# Patient Record
Sex: FEMALE | Race: WHITE | NOT HISPANIC OR LATINO | Employment: FULL TIME | URBAN - METROPOLITAN AREA
[De-identification: names, ages, dates, MRNs, and addresses within clinical notes are randomized per-mention and may not be internally consistent; named-entity substitution may affect disease eponyms.]

---

## 2019-12-18 LAB — PAP SMEAR: NORMAL

## 2020-05-28 DIAGNOSIS — Z12.31 ENCOUNTER FOR SCREENING MAMMOGRAM FOR MALIGNANT NEOPLASM OF BREAST: Primary | ICD-10-CM

## 2020-06-03 PROBLEM — E03.9 HYPOTHYROIDISM: Status: ACTIVE | Noted: 2020-06-03

## 2020-06-03 PROBLEM — Z72.0 TOBACCO USE: Status: ACTIVE | Noted: 2020-06-03

## 2020-06-05 ENCOUNTER — HOSPITAL ENCOUNTER (OUTPATIENT)
Dept: RADIOLOGY | Facility: HOSPITAL | Age: 45
Discharge: HOME | End: 2020-06-05
Attending: FAMILY MEDICINE
Payer: COMMERCIAL

## 2020-06-05 VITALS — HEIGHT: 62 IN | BODY MASS INDEX: 27.79 KG/M2 | WEIGHT: 151 LBS

## 2020-06-05 DIAGNOSIS — Z12.31 ENCOUNTER FOR SCREENING MAMMOGRAM FOR MALIGNANT NEOPLASM OF BREAST: ICD-10-CM

## 2020-06-05 PROCEDURE — 77067 SCR MAMMO BI INCL CAD: CPT | Mod: TC,PO

## 2021-01-15 ENCOUNTER — TELEPHONE (OUTPATIENT)
Dept: FAMILY MEDICINE | Facility: CLINIC | Age: 46
End: 2021-01-15
Payer: COMMERCIAL

## 2021-01-16 NOTE — TELEPHONE ENCOUNTER
----- Message from Marie Arevalo sent at 1/15/2021 12:32 PM CST -----  Regarding: Rx refills  Type:  RX Refill Request    Who Called:  ALVINA BREAUX [[id number]]  Refill or New Rx:  Refill  RX Name and Strength:  ALPRAZolam (XANAX) 0.5 MG tablet How is the patient currently taking it? (ex. 1XDay):  As needed,lately has been taking 1xday    RX Name and Strength: levothyroxine (SYNTHROID) 137 MCG Tab tablet How is the patient currently taking it? (ex. 1XDay):  1xday  Is this a 30 day or 90 day RX:  30 days for both  Preferred Pharmacy with phone number:  [facility] at [phone]  Local or Mail Order:  Local  Ordering Provider:  Dr. Jahaira Lopez Call Back Number:  [phone]  Additional Information:  Patient states she is a  and not able to come in to do blood work. Pt also states Dr. Campo advised her to still call and request her refills even if she cannot come to do the blood work before.

## 2021-01-19 ENCOUNTER — TELEPHONE (OUTPATIENT)
Dept: FAMILY MEDICINE | Facility: CLINIC | Age: 46
End: 2021-01-19

## 2021-06-03 ENCOUNTER — TELEPHONE (OUTPATIENT)
Dept: FAMILY MEDICINE | Facility: CLINIC | Age: 46
End: 2021-06-03

## 2021-06-03 DIAGNOSIS — E03.9 HYPOTHYROIDISM, UNSPECIFIED TYPE: ICD-10-CM

## 2021-06-03 DIAGNOSIS — Z00.00 PHYSICAL EXAM: Primary | ICD-10-CM

## 2021-06-03 DIAGNOSIS — Z11.4 ENCOUNTER FOR SCREENING FOR HUMAN IMMUNODEFICIENCY VIRUS (HIV): ICD-10-CM

## 2021-06-03 DIAGNOSIS — Z11.59 ENCOUNTER FOR HEPATITIS C SCREENING TEST FOR LOW RISK PATIENT: ICD-10-CM

## 2021-06-07 ENCOUNTER — OFFICE VISIT (OUTPATIENT)
Dept: FAMILY MEDICINE | Facility: CLINIC | Age: 46
End: 2021-06-07
Payer: COMMERCIAL

## 2021-06-07 VITALS
DIASTOLIC BLOOD PRESSURE: 59 MMHG | SYSTOLIC BLOOD PRESSURE: 109 MMHG | OXYGEN SATURATION: 98 % | BODY MASS INDEX: 30.18 KG/M2 | WEIGHT: 164 LBS | TEMPERATURE: 97 F | HEART RATE: 86 BPM | HEIGHT: 62 IN

## 2021-06-07 DIAGNOSIS — Z12.39 SCREENING BREAST EXAMINATION: ICD-10-CM

## 2021-06-07 DIAGNOSIS — F41.9 ANXIETY: ICD-10-CM

## 2021-06-07 DIAGNOSIS — Z11.51 SCREENING FOR HPV (HUMAN PAPILLOMAVIRUS): ICD-10-CM

## 2021-06-07 DIAGNOSIS — Z72.0 TOBACCO USE: ICD-10-CM

## 2021-06-07 DIAGNOSIS — E03.9 HYPOTHYROIDISM, UNSPECIFIED TYPE: Primary | ICD-10-CM

## 2021-06-07 DIAGNOSIS — Z78.0 MENOPAUSE: ICD-10-CM

## 2021-06-07 DIAGNOSIS — Z12.31 ENCOUNTER FOR SCREENING MAMMOGRAM FOR BREAST CANCER: Primary | ICD-10-CM

## 2021-06-07 PROCEDURE — 99214 OFFICE O/P EST MOD 30 MIN: CPT | Mod: 25,S$GLB,, | Performed by: FAMILY MEDICINE

## 2021-06-07 PROCEDURE — 90471 TD VACCINE GREATER THAN OR EQUAL TO 7YO PRESERVATIVE FREE IM: ICD-10-PCS | Mod: S$GLB,,, | Performed by: FAMILY MEDICINE

## 2021-06-07 PROCEDURE — 99214 PR OFFICE/OUTPT VISIT, EST, LEVL IV, 30-39 MIN: ICD-10-PCS | Mod: 25,S$GLB,, | Performed by: FAMILY MEDICINE

## 2021-06-07 PROCEDURE — 90714 TD VACCINE GREATER THAN OR EQUAL TO 7YO PRESERVATIVE FREE IM: ICD-10-PCS | Mod: S$GLB,,, | Performed by: FAMILY MEDICINE

## 2021-06-07 PROCEDURE — 1126F AMNT PAIN NOTED NONE PRSNT: CPT | Mod: S$GLB,,, | Performed by: FAMILY MEDICINE

## 2021-06-07 PROCEDURE — 1126F PR PAIN SEVERITY QUANTIFIED, NO PAIN PRESENT: ICD-10-PCS | Mod: S$GLB,,, | Performed by: FAMILY MEDICINE

## 2021-06-07 PROCEDURE — 3008F BODY MASS INDEX DOCD: CPT | Mod: CPTII,S$GLB,, | Performed by: FAMILY MEDICINE

## 2021-06-07 PROCEDURE — 90714 TD VACC NO PRESV 7 YRS+ IM: CPT | Mod: S$GLB,,, | Performed by: FAMILY MEDICINE

## 2021-06-07 PROCEDURE — 3008F PR BODY MASS INDEX (BMI) DOCUMENTED: ICD-10-PCS | Mod: CPTII,S$GLB,, | Performed by: FAMILY MEDICINE

## 2021-06-07 PROCEDURE — 90471 IMMUNIZATION ADMIN: CPT | Mod: S$GLB,,, | Performed by: FAMILY MEDICINE

## 2021-06-07 RX ORDER — BUPROPION HYDROCHLORIDE 150 MG/1
150 TABLET, EXTENDED RELEASE ORAL 2 TIMES DAILY
COMMUNITY
End: 2021-06-07 | Stop reason: SDUPTHER

## 2021-06-07 RX ORDER — BUPROPION HYDROCHLORIDE 150 MG/1
TABLET, EXTENDED RELEASE ORAL
Qty: 60 TABLET | Refills: 2 | Status: SHIPPED | OUTPATIENT
Start: 2021-06-07 | End: 2022-02-01

## 2021-06-07 RX ORDER — LEVOTHYROXINE SODIUM 137 UG/1
137 TABLET ORAL DAILY
Qty: 90 TABLET | Refills: 1 | Status: SHIPPED | OUTPATIENT
Start: 2021-06-07 | End: 2022-01-14

## 2021-06-07 RX ORDER — ALPRAZOLAM 0.5 MG/1
0.5 TABLET ORAL NIGHTLY PRN
Qty: 30 TABLET | Refills: 2 | Status: SHIPPED | OUTPATIENT
Start: 2021-06-07 | End: 2022-11-01 | Stop reason: SDUPTHER

## 2021-06-29 ENCOUNTER — HOSPITAL ENCOUNTER (OUTPATIENT)
Dept: RADIOLOGY | Facility: HOSPITAL | Age: 46
Discharge: HOME | End: 2021-06-29
Attending: FAMILY MEDICINE
Payer: COMMERCIAL

## 2021-06-29 VITALS — WEIGHT: 164 LBS | HEIGHT: 62 IN | BODY MASS INDEX: 30.18 KG/M2

## 2021-06-29 DIAGNOSIS — Z12.31 ENCOUNTER FOR SCREENING MAMMOGRAM FOR BREAST CANCER: ICD-10-CM

## 2021-06-29 PROCEDURE — 77067 SCR MAMMO BI INCL CAD: CPT | Mod: TC,PO

## 2021-06-30 LAB
ALBUMIN SERPL-MCNC: 4.4 G/DL (ref 3.6–5.1)
ALBUMIN/GLOB SERPL: 1.5 (CALC) (ref 1–2.5)
ALP SERPL-CCNC: 67 U/L (ref 31–125)
ALT SERPL-CCNC: 10 U/L (ref 6–29)
AST SERPL-CCNC: 17 U/L (ref 10–35)
BASOPHILS # BLD AUTO: 40 CELLS/UL (ref 0–200)
BASOPHILS NFR BLD AUTO: 0.6 %
BILIRUB SERPL-MCNC: 0.3 MG/DL (ref 0.2–1.2)
BUN SERPL-MCNC: 13 MG/DL (ref 7–25)
BUN/CREAT SERPL: NORMAL (CALC) (ref 6–22)
CALCIUM SERPL-MCNC: 9.8 MG/DL (ref 8.6–10.2)
CHLORIDE SERPL-SCNC: 103 MMOL/L (ref 98–110)
CHOLEST SERPL-MCNC: 284 MG/DL
CHOLEST/HDLC SERPL: 4.3 (CALC)
CO2 SERPL-SCNC: 28 MMOL/L (ref 20–32)
CREAT SERPL-MCNC: 0.68 MG/DL (ref 0.5–1.1)
EOSINOPHIL # BLD AUTO: 92 CELLS/UL (ref 15–500)
EOSINOPHIL NFR BLD AUTO: 1.4 %
ERYTHROCYTE [DISTWIDTH] IN BLOOD BY AUTOMATED COUNT: 12.5 % (ref 11–15)
GLOBULIN SER CALC-MCNC: 2.9 G/DL (CALC) (ref 1.9–3.7)
GLUCOSE SERPL-MCNC: 78 MG/DL (ref 65–99)
HCT VFR BLD AUTO: 44 % (ref 35–45)
HCV AB S/CO SERPL IA: 0.04
HCV AB SERPL QL IA: NORMAL
HDLC SERPL-MCNC: 66 MG/DL
HGB BLD-MCNC: 14.8 G/DL (ref 11.7–15.5)
HIV 1+2 AB+HIV1 P24 AG SERPL QL IA: NORMAL
LDLC SERPL CALC-MCNC: 182 MG/DL (CALC)
LYMPHOCYTES # BLD AUTO: 1426 CELLS/UL (ref 850–3900)
LYMPHOCYTES NFR BLD AUTO: 21.6 %
MCH RBC QN AUTO: 32.2 PG (ref 27–33)
MCHC RBC AUTO-ENTMCNC: 33.6 G/DL (ref 32–36)
MCV RBC AUTO: 95.7 FL (ref 80–100)
MONOCYTES # BLD AUTO: 620 CELLS/UL (ref 200–950)
MONOCYTES NFR BLD AUTO: 9.4 %
NEUTROPHILS # BLD AUTO: 4422 CELLS/UL (ref 1500–7800)
NEUTROPHILS NFR BLD AUTO: 67 %
NONHDLC SERPL-MCNC: 218 MG/DL (CALC)
PLATELET # BLD AUTO: 310 THOUSAND/UL (ref 140–400)
PMV BLD REES-ECKER: 10.1 FL (ref 7.5–12.5)
POTASSIUM SERPL-SCNC: 4.5 MMOL/L (ref 3.5–5.3)
PROT SERPL-MCNC: 7.3 G/DL (ref 6.1–8.1)
RBC # BLD AUTO: 4.6 MILLION/UL (ref 3.8–5.1)
SODIUM SERPL-SCNC: 138 MMOL/L (ref 135–146)
T4 FREE SERPL-MCNC: 1.2 NG/DL (ref 0.8–1.8)
TRIGL SERPL-MCNC: 204 MG/DL
TSH SERPL-ACNC: 2.43 MIU/L
WBC # BLD AUTO: 6.6 THOUSAND/UL (ref 3.8–10.8)

## 2021-07-12 ENCOUNTER — OFFICE VISIT (OUTPATIENT)
Dept: FAMILY MEDICINE | Facility: CLINIC | Age: 46
End: 2021-07-12
Payer: COMMERCIAL

## 2021-07-12 VITALS
DIASTOLIC BLOOD PRESSURE: 59 MMHG | TEMPERATURE: 97 F | HEIGHT: 62 IN | WEIGHT: 161 LBS | BODY MASS INDEX: 29.63 KG/M2 | SYSTOLIC BLOOD PRESSURE: 110 MMHG | HEART RATE: 88 BPM | OXYGEN SATURATION: 98 %

## 2021-07-12 DIAGNOSIS — E03.9 HYPOTHYROIDISM, UNSPECIFIED TYPE: Primary | ICD-10-CM

## 2021-07-12 DIAGNOSIS — Z72.0 TOBACCO USE: ICD-10-CM

## 2021-07-12 DIAGNOSIS — E78.5 HYPERLIPIDEMIA, UNSPECIFIED HYPERLIPIDEMIA TYPE: ICD-10-CM

## 2021-07-12 DIAGNOSIS — J30.9 ALLERGIC RHINITIS, UNSPECIFIED SEASONALITY, UNSPECIFIED TRIGGER: ICD-10-CM

## 2021-07-12 PROCEDURE — 3008F BODY MASS INDEX DOCD: CPT | Mod: CPTII,S$GLB,, | Performed by: FAMILY MEDICINE

## 2021-07-12 PROCEDURE — 3008F PR BODY MASS INDEX (BMI) DOCUMENTED: ICD-10-PCS | Mod: CPTII,S$GLB,, | Performed by: FAMILY MEDICINE

## 2021-07-12 PROCEDURE — 1126F PR PAIN SEVERITY QUANTIFIED, NO PAIN PRESENT: ICD-10-PCS | Mod: S$GLB,,, | Performed by: FAMILY MEDICINE

## 2021-07-12 PROCEDURE — 99214 OFFICE O/P EST MOD 30 MIN: CPT | Mod: S$GLB,,, | Performed by: FAMILY MEDICINE

## 2021-07-12 PROCEDURE — 99214 PR OFFICE/OUTPT VISIT, EST, LEVL IV, 30-39 MIN: ICD-10-PCS | Mod: S$GLB,,, | Performed by: FAMILY MEDICINE

## 2021-07-12 PROCEDURE — 1126F AMNT PAIN NOTED NONE PRSNT: CPT | Mod: S$GLB,,, | Performed by: FAMILY MEDICINE

## 2021-07-12 RX ORDER — ROSUVASTATIN CALCIUM 20 MG/1
20 TABLET, COATED ORAL DAILY
COMMUNITY
End: 2021-07-12 | Stop reason: SDUPTHER

## 2021-07-12 RX ORDER — ROSUVASTATIN CALCIUM 20 MG/1
20 TABLET, COATED ORAL DAILY
Qty: 90 TABLET | Refills: 1 | Status: SHIPPED | OUTPATIENT
Start: 2021-07-12 | End: 2022-02-01 | Stop reason: SDUPTHER

## 2021-07-18 PROBLEM — E78.5 HYPERLIPIDEMIA: Status: ACTIVE | Noted: 2021-07-18

## 2021-07-18 PROBLEM — J30.9 ALLERGIC RHINITIS: Status: ACTIVE | Noted: 2021-07-18

## 2021-09-09 ENCOUNTER — PATIENT OUTREACH (OUTPATIENT)
Dept: ADMINISTRATIVE | Facility: HOSPITAL | Age: 46
End: 2021-09-09

## 2021-10-12 ENCOUNTER — TELEPHONE (OUTPATIENT)
Dept: FAMILY MEDICINE | Facility: CLINIC | Age: 46
End: 2021-10-12

## 2022-01-12 ENCOUNTER — TELEPHONE (OUTPATIENT)
Dept: CARDIOLOGY | Facility: CLINIC | Age: 47
End: 2022-01-12
Payer: COMMERCIAL

## 2022-01-12 NOTE — TELEPHONE ENCOUNTER
----- Message from Pedro Carpenter sent at 1/12/2022  3:08 PM CST -----  Contact: pt  Type: Rx Refill Request    Who Called:pt  Refill or New Rx:Refill  Rx Name and Strength:    levothyroxine (SYNTHROID) 137 MCG Tab tablet    How is the patient currently taking it?(ex.1xday): As Directed  Is this a 30 day or 90 day Rx: As Directed  Preferred Pharmacy with Phone Number:    [facility] - [place], [place] - [address] [facility] [address] [address] [address] [address]  [place] [address]  Phone: [phone] Fax: [phone]      Trying to be seen when pt available for bloodwork and appt     Call back for this [phone]

## 2022-02-01 ENCOUNTER — OFFICE VISIT (OUTPATIENT)
Dept: FAMILY MEDICINE | Facility: CLINIC | Age: 47
End: 2022-02-01
Payer: COMMERCIAL

## 2022-02-01 ENCOUNTER — HOSPITAL ENCOUNTER (OUTPATIENT)
Dept: RADIOLOGY | Facility: HOSPITAL | Age: 47
Discharge: HOME | End: 2022-02-01
Attending: FAMILY MEDICINE
Payer: COMMERCIAL

## 2022-02-01 VITALS
BODY MASS INDEX: 30.18 KG/M2 | DIASTOLIC BLOOD PRESSURE: 60 MMHG | HEART RATE: 84 BPM | WEIGHT: 164 LBS | SYSTOLIC BLOOD PRESSURE: 120 MMHG | OXYGEN SATURATION: 98 % | HEIGHT: 62 IN | TEMPERATURE: 98 F

## 2022-02-01 DIAGNOSIS — M25.551 ARTHRALGIA OF RIGHT HIP: ICD-10-CM

## 2022-02-01 DIAGNOSIS — Z72.0 TOBACCO USE: ICD-10-CM

## 2022-02-01 DIAGNOSIS — E78.5 HYPERLIPIDEMIA, UNSPECIFIED HYPERLIPIDEMIA TYPE: Primary | ICD-10-CM

## 2022-02-01 DIAGNOSIS — Z12.11 COLON CANCER SCREENING: ICD-10-CM

## 2022-02-01 DIAGNOSIS — E03.9 HYPOTHYROIDISM, UNSPECIFIED TYPE: ICD-10-CM

## 2022-02-01 PROCEDURE — 3078F PR MOST RECENT DIASTOLIC BLOOD PRESSURE < 80 MM HG: ICD-10-PCS | Mod: CPTII,S$GLB,, | Performed by: FAMILY MEDICINE

## 2022-02-01 PROCEDURE — 3074F SYST BP LT 130 MM HG: CPT | Mod: CPTII,S$GLB,, | Performed by: FAMILY MEDICINE

## 2022-02-01 PROCEDURE — 99214 OFFICE O/P EST MOD 30 MIN: CPT | Mod: S$GLB,,, | Performed by: FAMILY MEDICINE

## 2022-02-01 PROCEDURE — 3008F BODY MASS INDEX DOCD: CPT | Mod: CPTII,S$GLB,, | Performed by: FAMILY MEDICINE

## 2022-02-01 PROCEDURE — 1159F MED LIST DOCD IN RCRD: CPT | Mod: CPTII,S$GLB,, | Performed by: FAMILY MEDICINE

## 2022-02-01 PROCEDURE — 99214 PR OFFICE/OUTPT VISIT, EST, LEVL IV, 30-39 MIN: ICD-10-PCS | Mod: S$GLB,,, | Performed by: FAMILY MEDICINE

## 2022-02-01 PROCEDURE — 3074F PR MOST RECENT SYSTOLIC BLOOD PRESSURE < 130 MM HG: ICD-10-PCS | Mod: CPTII,S$GLB,, | Performed by: FAMILY MEDICINE

## 2022-02-01 PROCEDURE — 73502 X-RAY EXAM HIP UNI 2-3 VIEWS: CPT | Mod: TC,RT

## 2022-02-01 PROCEDURE — 3078F DIAST BP <80 MM HG: CPT | Mod: CPTII,S$GLB,, | Performed by: FAMILY MEDICINE

## 2022-02-01 PROCEDURE — 3008F PR BODY MASS INDEX (BMI) DOCUMENTED: ICD-10-PCS | Mod: CPTII,S$GLB,, | Performed by: FAMILY MEDICINE

## 2022-02-01 PROCEDURE — 1159F PR MEDICATION LIST DOCUMENTED IN MEDICAL RECORD: ICD-10-PCS | Mod: CPTII,S$GLB,, | Performed by: FAMILY MEDICINE

## 2022-02-01 RX ORDER — BUPROPION HYDROCHLORIDE 300 MG/1
300 TABLET ORAL DAILY
COMMUNITY
End: 2022-02-01 | Stop reason: SDUPTHER

## 2022-02-01 RX ORDER — BUPROPION HYDROCHLORIDE 150 MG/1
TABLET, EXTENDED RELEASE ORAL
Qty: 60 TABLET | Refills: 2 | Status: CANCELLED | OUTPATIENT
Start: 2022-02-01

## 2022-02-01 RX ORDER — HYDROCODONE BITARTRATE AND ACETAMINOPHEN 7.5; 325 MG/1; MG/1
1 TABLET ORAL
COMMUNITY
Start: 2021-10-21 | End: 2023-10-20

## 2022-02-01 RX ORDER — LEVOTHYROXINE SODIUM 137 UG/1
137 TABLET ORAL DAILY
Qty: 90 TABLET | Refills: 1 | Status: SHIPPED | OUTPATIENT
Start: 2022-02-01 | End: 2022-11-01 | Stop reason: SDUPTHER

## 2022-02-01 RX ORDER — BUPROPION HYDROCHLORIDE 300 MG/1
300 TABLET ORAL DAILY
Qty: 90 TABLET | Refills: 1 | Status: SHIPPED | OUTPATIENT
Start: 2022-02-01 | End: 2022-11-01 | Stop reason: SDUPTHER

## 2022-02-01 RX ORDER — ROSUVASTATIN CALCIUM 20 MG/1
20 TABLET, COATED ORAL DAILY
Qty: 90 TABLET | Refills: 1 | Status: SHIPPED | OUTPATIENT
Start: 2022-02-01 | End: 2022-11-01 | Stop reason: SDUPTHER

## 2022-02-02 LAB
ALBUMIN SERPL-MCNC: 4.5 G/DL (ref 3.6–5.1)
ALBUMIN/GLOB SERPL: 1.5 (CALC) (ref 1–2.5)
ALP SERPL-CCNC: 75 U/L (ref 31–125)
ALT SERPL-CCNC: 10 U/L (ref 6–29)
AST SERPL-CCNC: 18 U/L (ref 10–35)
BILIRUB SERPL-MCNC: 0.7 MG/DL (ref 0.2–1.2)
BUN SERPL-MCNC: 11 MG/DL (ref 7–25)
BUN/CREAT SERPL: NORMAL (CALC) (ref 6–22)
CALCIUM SERPL-MCNC: 10.2 MG/DL (ref 8.6–10.2)
CHLORIDE SERPL-SCNC: 102 MMOL/L (ref 98–110)
CHOLEST SERPL-MCNC: 287 MG/DL
CHOLEST/HDLC SERPL: 3.7 (CALC)
CO2 SERPL-SCNC: 25 MMOL/L (ref 20–32)
CREAT SERPL-MCNC: 0.71 MG/DL (ref 0.5–1.1)
GLOBULIN SER CALC-MCNC: 3 G/DL (CALC) (ref 1.9–3.7)
GLUCOSE SERPL-MCNC: 81 MG/DL (ref 65–99)
HDLC SERPL-MCNC: 78 MG/DL
LDLC SERPL CALC-MCNC: 178 MG/DL (CALC)
NONHDLC SERPL-MCNC: 209 MG/DL (CALC)
POTASSIUM SERPL-SCNC: 5 MMOL/L (ref 3.5–5.3)
PROT SERPL-MCNC: 7.5 G/DL (ref 6.1–8.1)
SODIUM SERPL-SCNC: 140 MMOL/L (ref 135–146)
T4 FREE SERPL-MCNC: 1.3 NG/DL (ref 0.8–1.8)
TRIGL SERPL-MCNC: 164 MG/DL
TSH SERPL-ACNC: 3.11 MIU/L

## 2022-02-02 NOTE — PROGRESS NOTES
Early December started having pain right proximal thigh did some climbing in and out of the attic then pain right medial groin often on with some movements.  Increased by getting into the truck.  Not really the action of lifting the leg but pushing up.  Was hurting when walking but has gotten little better.  Was limping some also.  It is slightly better.  Has not used any medication for it.  Also follow-up hypothyroidism tobacco use hyperlipidemia.  Colon cancer screening is due.  No family history.    Physical examination vital signs are noted.  Neck without bruit.  Chest clear.  Heart regular rate rhythm.  Abdomen bowel sounds are positive soft nontender no CVA tenderness.  Right groin no bulge no evidence of hernia nontender.  Right hip painful with flexed internal rotation.    Impression.  Hypothyroidism.  Tobacco use.  Hyperlipidemia.  Arthralgia of the right hip.  High colon cancer screening.  Discussed colonoscopy and Cologuard she wants to do  the Cologuard.    Plan change Wellbutrin  daily.  Ninety with 1 refill.  See if this will help with the smoking.  Refill Crestor 20 mg daily 90 with a refill.  Refill levothyroxine 13312 with a refill.  Free T4 TSH lipid CMP.  Cologuard ordered.  X-ray the right hip.

## 2022-02-04 ENCOUNTER — TELEPHONE (OUTPATIENT)
Dept: FAMILY MEDICINE | Facility: CLINIC | Age: 47
End: 2022-02-04
Payer: COMMERCIAL

## 2022-02-04 PROBLEM — J30.9 ALLERGIC RHINITIS: Status: RESOLVED | Noted: 2021-07-18 | Resolved: 2022-02-04

## 2022-02-04 PROBLEM — M25.551 ARTHRALGIA OF RIGHT HIP: Status: ACTIVE | Noted: 2022-02-04

## 2022-02-04 NOTE — TELEPHONE ENCOUNTER
----- Message from Pedro Carpenter sent at 2/4/2022  4:17 PM CST -----  Contact: pt  Type: Needs Medical Advice    Who Called:pt  Best Call Back Number:[phone]    Additional Information: Requesting callback regarding pt has xray results and needs a call back to review them please call back pt     Please Advise-Thank you       Done

## 2022-04-05 DIAGNOSIS — Z12.11 SPECIAL SCREENING FOR MALIGNANT NEOPLASMS, COLON: Primary | ICD-10-CM

## 2022-07-19 DIAGNOSIS — Z12.31 ENCOUNTER FOR SCREENING MAMMOGRAM FOR MALIGNANT NEOPLASM OF BREAST: Primary | ICD-10-CM

## 2022-07-29 ENCOUNTER — HOSPITAL ENCOUNTER (OUTPATIENT)
Dept: RADIOLOGY | Facility: HOSPITAL | Age: 47
Discharge: HOME | End: 2022-07-29
Attending: FAMILY MEDICINE
Payer: COMMERCIAL

## 2022-07-29 VITALS — BODY MASS INDEX: 30.18 KG/M2 | HEIGHT: 62 IN | WEIGHT: 164 LBS

## 2022-07-29 DIAGNOSIS — Z12.31 ENCOUNTER FOR SCREENING MAMMOGRAM FOR MALIGNANT NEOPLASM OF BREAST: ICD-10-CM

## 2022-07-29 PROCEDURE — 77063 BREAST TOMOSYNTHESIS BI: CPT | Mod: TC,PO

## 2022-07-29 PROCEDURE — 77067 SCR MAMMO BI INCL CAD: CPT | Mod: TC,PO

## 2022-10-21 ENCOUNTER — TELEPHONE (OUTPATIENT)
Dept: FAMILY MEDICINE | Facility: CLINIC | Age: 47
End: 2022-10-21
Payer: COMMERCIAL

## 2022-10-21 NOTE — TELEPHONE ENCOUNTER
----- Message from Trung Agudelo sent at 10/21/2022  1:15 PM CDT -----  Regarding: refill  Contact: Patient  Type:  RX Refill Request    Who Called:  Patient  Refill or New Rx:  Refill  RX Name and Strength:  WELLBUTRIN XL) 300   How is the patient currently taking it? (ex. 1XDay):  one time a day  Is this a 30 day or 90 day RX:  30day  Preferred Pharmacy with phone number:    [facility] [place] [facility] - [facility] [id number]  [address] [facility] [address]  [place] [address]  Phone: [phone] Fax: [phone]     Local or Mail Order:  Local  Ordering Provider:  Dr Jahaira Lopez Call Back Number:  [phone] (home) [phone] (work)    Case number [id number]

## 2022-11-01 ENCOUNTER — OFFICE VISIT (OUTPATIENT)
Dept: FAMILY MEDICINE | Facility: CLINIC | Age: 47
End: 2022-11-01
Payer: COMMERCIAL

## 2022-11-01 VITALS
OXYGEN SATURATION: 97 % | HEIGHT: 62 IN | DIASTOLIC BLOOD PRESSURE: 74 MMHG | SYSTOLIC BLOOD PRESSURE: 118 MMHG | HEART RATE: 85 BPM | TEMPERATURE: 98 F | WEIGHT: 167.63 LBS | BODY MASS INDEX: 30.85 KG/M2

## 2022-11-01 DIAGNOSIS — Z12.11 COLON CANCER SCREENING: ICD-10-CM

## 2022-11-01 DIAGNOSIS — E03.9 HYPOTHYROIDISM, UNSPECIFIED TYPE: ICD-10-CM

## 2022-11-01 DIAGNOSIS — F41.9 ANXIETY: ICD-10-CM

## 2022-11-01 DIAGNOSIS — Z72.0 TOBACCO USE: ICD-10-CM

## 2022-11-01 DIAGNOSIS — R10.31 RIGHT GROIN PAIN: ICD-10-CM

## 2022-11-01 DIAGNOSIS — E78.5 HYPERLIPIDEMIA, UNSPECIFIED HYPERLIPIDEMIA TYPE: Primary | ICD-10-CM

## 2022-11-01 DIAGNOSIS — I25.10 ASCVD (ARTERIOSCLEROTIC CARDIOVASCULAR DISEASE): ICD-10-CM

## 2022-11-01 PROCEDURE — 3008F BODY MASS INDEX DOCD: CPT | Mod: CPTII,S$GLB,, | Performed by: FAMILY MEDICINE

## 2022-11-01 PROCEDURE — 3078F DIAST BP <80 MM HG: CPT | Mod: CPTII,S$GLB,, | Performed by: FAMILY MEDICINE

## 2022-11-01 PROCEDURE — 1159F MED LIST DOCD IN RCRD: CPT | Mod: CPTII,S$GLB,, | Performed by: FAMILY MEDICINE

## 2022-11-01 PROCEDURE — 3074F SYST BP LT 130 MM HG: CPT | Mod: CPTII,S$GLB,, | Performed by: FAMILY MEDICINE

## 2022-11-01 PROCEDURE — 1160F PR REVIEW ALL MEDS BY PRESCRIBER/CLIN PHARMACIST DOCUMENTED: ICD-10-PCS | Mod: CPTII,S$GLB,, | Performed by: FAMILY MEDICINE

## 2022-11-01 PROCEDURE — 3078F PR MOST RECENT DIASTOLIC BLOOD PRESSURE < 80 MM HG: ICD-10-PCS | Mod: CPTII,S$GLB,, | Performed by: FAMILY MEDICINE

## 2022-11-01 PROCEDURE — 1159F PR MEDICATION LIST DOCUMENTED IN MEDICAL RECORD: ICD-10-PCS | Mod: CPTII,S$GLB,, | Performed by: FAMILY MEDICINE

## 2022-11-01 PROCEDURE — 3008F PR BODY MASS INDEX (BMI) DOCUMENTED: ICD-10-PCS | Mod: CPTII,S$GLB,, | Performed by: FAMILY MEDICINE

## 2022-11-01 PROCEDURE — 99214 OFFICE O/P EST MOD 30 MIN: CPT | Mod: S$GLB,,, | Performed by: FAMILY MEDICINE

## 2022-11-01 PROCEDURE — 99214 PR OFFICE/OUTPT VISIT, EST, LEVL IV, 30-39 MIN: ICD-10-PCS | Mod: S$GLB,,, | Performed by: FAMILY MEDICINE

## 2022-11-01 PROCEDURE — 1160F RVW MEDS BY RX/DR IN RCRD: CPT | Mod: CPTII,S$GLB,, | Performed by: FAMILY MEDICINE

## 2022-11-01 PROCEDURE — 3074F PR MOST RECENT SYSTOLIC BLOOD PRESSURE < 130 MM HG: ICD-10-PCS | Mod: CPTII,S$GLB,, | Performed by: FAMILY MEDICINE

## 2022-11-01 RX ORDER — ALPRAZOLAM 0.5 MG/1
0.5 TABLET ORAL NIGHTLY PRN
Qty: 30 TABLET | Refills: 2 | Status: SHIPPED | OUTPATIENT
Start: 2022-11-01 | End: 2023-10-20 | Stop reason: SDUPTHER

## 2022-11-01 RX ORDER — LEVOTHYROXINE SODIUM 137 UG/1
137 TABLET ORAL DAILY
Qty: 90 TABLET | Refills: 1 | Status: SHIPPED | OUTPATIENT
Start: 2022-11-01 | End: 2023-10-20 | Stop reason: SDUPTHER

## 2022-11-01 RX ORDER — ROSUVASTATIN CALCIUM 20 MG/1
20 TABLET, COATED ORAL DAILY
Qty: 90 TABLET | Refills: 1 | Status: SHIPPED | OUTPATIENT
Start: 2022-11-01 | End: 2023-10-20 | Stop reason: SDUPTHER

## 2022-11-01 RX ORDER — BUPROPION HYDROCHLORIDE 300 MG/1
300 TABLET ORAL DAILY
Qty: 90 TABLET | Refills: 1 | Status: SHIPPED | OUTPATIENT
Start: 2022-11-01 | End: 2023-10-20 | Stop reason: SDUPTHER

## 2022-11-01 NOTE — PROGRESS NOTES
Subjective:       Patient ID: Leslie Boasso Cheramie is a 47 y.o. female.    Chief Complaint: Follow-up    Patient presents to clinic for follow up and medication refills. Hyperlipidemia. Tolerating Crestor. No statin myalgias. Cardiovascular ok. Denies chest pain or palpitations. Hypothyroidism. Synthroid 137 mcg. Current smoker 1/2 ppd On Wellbutrin. Right groin pain. Pain aggravated with leg movement. Xray of hips showed no fracture or dislocation. If pain persists recommended to see orthopedist for injections or PT. Anxiety. Xanax as needed. Rarely uses. Due for colon cancer screening. Has cologuard kit at home. No Covid vaccinations. Due for pneumonia and influenza vaccination.   Review of Systems   Respiratory: Negative.     Cardiovascular: Negative.  Negative for chest pain, palpitations and claudication.   Musculoskeletal:         Right groin pain    Psychiatric/Behavioral: Negative.         Objective:      Physical Exam  Constitutional:       Appearance: Normal appearance.   Neck:      Vascular: Carotid bruit present.      Comments: Carotid bruit. No claudication. No calf tenderness. Advised to start Aspirin 81 mg. Discontinue smoking  Cardiovascular:      Rate and Rhythm: Normal rate and regular rhythm.      Pulses: Normal pulses.      Heart sounds: Normal heart sounds.   Pulmonary:      Effort: Pulmonary effort is normal.      Breath sounds: Normal breath sounds.   Musculoskeletal:         General: Tenderness present. Normal range of motion.      Right hip: Tenderness present. Normal range of motion.   Lymphadenopathy:      Cervical: No cervical adenopathy.   Skin:     General: Skin is warm and dry.   Neurological:      Mental Status: She is alert.   Psychiatric:         Mood and Affect: Mood normal.         Behavior: Behavior normal.       Assessment/Plan:     Hyperlipidemia, unspecified hyperlipidemia type  -     Comprehensive Metabolic Panel; Future; Expected date: 11/01/2022  -     Lipid Panel;  Future; Expected date: 11/01/2022    Hypothyroidism, unspecified type  -     TSH; Future; Expected date: 11/01/2022    Colon cancer screening    Anxiety    Tobacco use    Right groin pain  -     MRI Hip Without Contrast Right; Future; Expected date: 11/01/2022    BMI 30.0-30.9,adult    ASCVD (arteriosclerotic cardiovascular disease)  -     US Carotid Bilateral; Future; Expected date: 11/01/2022    Other orders  -     buPROPion (WELLBUTRIN XL) 300 MG 24 hr tablet; Take 1 tablet (300 mg total) by mouth once daily.  Dispense: 90 tablet; Refill: 1  -     levothyroxine (SYNTHROID) 137 MCG Tab tablet; Take 1 tablet (137 mcg total) by mouth once daily.  Dispense: 90 tablet; Refill: 1  -     rosuvastatin (CRESTOR) 20 MG tablet; Take 1 tablet (20 mg total) by mouth once daily.  Dispense: 90 tablet; Refill: 1  -     ALPRAZolam (XANAX) 0.5 MG tablet; Take 1 tablet (0.5 mg total) by mouth nightly as needed for Anxiety.  Dispense: 30 tablet; Refill: 2     Needs to do Cologuard for colon cancer screening.  Refilled her medications.  Check lipids TSH and CMP.  Carotid ultrasound ordered due to the carotid bruit on the right.  ASCVD.  Start aspirin 81 per day.  MRI of the right hip and refer her to Orthopedics.

## 2022-12-02 ENCOUNTER — HOSPITAL ENCOUNTER (OUTPATIENT)
Dept: RADIOLOGY | Facility: HOSPITAL | Age: 47
Discharge: HOME | End: 2022-12-02
Attending: FAMILY MEDICINE
Payer: COMMERCIAL

## 2022-12-02 DIAGNOSIS — I25.10 ASCVD (ARTERIOSCLEROTIC CARDIOVASCULAR DISEASE): ICD-10-CM

## 2022-12-02 DIAGNOSIS — R10.31 RIGHT GROIN PAIN: ICD-10-CM

## 2022-12-02 PROCEDURE — 93880 EXTRACRANIAL BILAT STUDY: CPT | Mod: TC,PO

## 2022-12-02 PROCEDURE — 73721 MRI JNT OF LWR EXTRE W/O DYE: CPT | Mod: TC,PO,RT

## 2022-12-07 ENCOUNTER — TELEPHONE (OUTPATIENT)
Dept: FAMILY MEDICINE | Facility: CLINIC | Age: 47
End: 2022-12-07
Payer: COMMERCIAL

## 2022-12-07 NOTE — TELEPHONE ENCOUNTER
Done    ----- Message from Ismael Harper sent at 12/7/2022 10:31 AM CST -----  Type:  Patient Returning Call  Who Called:  Pt   Who Left Message for Patient:  not sure   Does the patient know what this is regarding?:  test results  Best Call Back Number:  [phone]  Additional Information:  Pt requesting a call back, missed call today.

## 2023-02-01 ENCOUNTER — PATIENT MESSAGE (OUTPATIENT)
Dept: ADMINISTRATIVE | Facility: HOSPITAL | Age: 48
End: 2023-02-01
Payer: COMMERCIAL

## 2023-04-11 ENCOUNTER — PATIENT MESSAGE (OUTPATIENT)
Dept: ADMINISTRATIVE | Facility: HOSPITAL | Age: 48
End: 2023-04-11
Payer: COMMERCIAL

## 2023-06-21 ENCOUNTER — PATIENT MESSAGE (OUTPATIENT)
Dept: ADMINISTRATIVE | Facility: HOSPITAL | Age: 48
End: 2023-06-21
Payer: COMMERCIAL

## 2023-08-10 ENCOUNTER — PATIENT MESSAGE (OUTPATIENT)
Dept: ADMINISTRATIVE | Facility: HOSPITAL | Age: 48
End: 2023-08-10
Payer: COMMERCIAL

## 2023-08-31 ENCOUNTER — PATIENT MESSAGE (OUTPATIENT)
Dept: ADMINISTRATIVE | Facility: HOSPITAL | Age: 48
End: 2023-08-31
Payer: COMMERCIAL

## 2023-08-31 DIAGNOSIS — Z12.31 OTHER SCREENING MAMMOGRAM: ICD-10-CM

## 2023-09-14 ENCOUNTER — PATIENT MESSAGE (OUTPATIENT)
Dept: ADMINISTRATIVE | Facility: HOSPITAL | Age: 48
End: 2023-09-14
Payer: COMMERCIAL

## 2023-09-27 DIAGNOSIS — Z12.31 ENCOUNTER FOR SCREENING MAMMOGRAM FOR MALIGNANT NEOPLASM OF BREAST: Primary | ICD-10-CM

## 2023-09-27 LAB
HUMAN PAPILLOMAVIRUS (HPV): NORMAL
PAP RECOMMENDATION EXT: NORMAL
PAP SMEAR: NORMAL

## 2023-09-28 DIAGNOSIS — Z78.0 ENCOUNTER FOR OSTEOPOROSIS SCREENING IN ASYMPTOMATIC POSTMENOPAUSAL PATIENT: Primary | ICD-10-CM

## 2023-09-28 DIAGNOSIS — N95.1 MENOPAUSAL STATE: ICD-10-CM

## 2023-09-28 DIAGNOSIS — Z13.820 ENCOUNTER FOR OSTEOPOROSIS SCREENING IN ASYMPTOMATIC POSTMENOPAUSAL PATIENT: Primary | ICD-10-CM

## 2023-10-20 ENCOUNTER — OFFICE VISIT (OUTPATIENT)
Dept: FAMILY MEDICINE | Facility: CLINIC | Age: 48
End: 2023-10-20
Payer: COMMERCIAL

## 2023-10-20 VITALS
WEIGHT: 169.81 LBS | TEMPERATURE: 98 F | BODY MASS INDEX: 31.25 KG/M2 | DIASTOLIC BLOOD PRESSURE: 86 MMHG | RESPIRATION RATE: 18 BRPM | HEIGHT: 62 IN | SYSTOLIC BLOOD PRESSURE: 122 MMHG | HEART RATE: 97 BPM | OXYGEN SATURATION: 96 %

## 2023-10-20 DIAGNOSIS — E03.9 HYPOTHYROIDISM, UNSPECIFIED TYPE: ICD-10-CM

## 2023-10-20 DIAGNOSIS — Z13.1 SCREENING FOR DIABETES MELLITUS: ICD-10-CM

## 2023-10-20 DIAGNOSIS — F41.9 ANXIETY: Primary | ICD-10-CM

## 2023-10-20 DIAGNOSIS — M25.551 ARTHRALGIA OF RIGHT HIP: ICD-10-CM

## 2023-10-20 DIAGNOSIS — E78.5 HYPERLIPIDEMIA, UNSPECIFIED HYPERLIPIDEMIA TYPE: ICD-10-CM

## 2023-10-20 DIAGNOSIS — I25.10 ASCVD (ARTERIOSCLEROTIC CARDIOVASCULAR DISEASE): ICD-10-CM

## 2023-10-20 PROCEDURE — 99214 PR OFFICE/OUTPT VISIT, EST, LEVL IV, 30-39 MIN: ICD-10-PCS | Mod: S$GLB,,,

## 2023-10-20 PROCEDURE — 3079F PR MOST RECENT DIASTOLIC BLOOD PRESSURE 80-89 MM HG: ICD-10-PCS | Mod: CPTII,S$GLB,,

## 2023-10-20 PROCEDURE — 99214 OFFICE O/P EST MOD 30 MIN: CPT | Mod: S$GLB,,,

## 2023-10-20 PROCEDURE — 3008F BODY MASS INDEX DOCD: CPT | Mod: CPTII,S$GLB,,

## 2023-10-20 PROCEDURE — 3074F SYST BP LT 130 MM HG: CPT | Mod: CPTII,S$GLB,,

## 2023-10-20 PROCEDURE — 3008F PR BODY MASS INDEX (BMI) DOCUMENTED: ICD-10-PCS | Mod: CPTII,S$GLB,,

## 2023-10-20 PROCEDURE — 3079F DIAST BP 80-89 MM HG: CPT | Mod: CPTII,S$GLB,,

## 2023-10-20 PROCEDURE — 1159F PR MEDICATION LIST DOCUMENTED IN MEDICAL RECORD: ICD-10-PCS | Mod: CPTII,S$GLB,,

## 2023-10-20 PROCEDURE — 1159F MED LIST DOCD IN RCRD: CPT | Mod: CPTII,S$GLB,,

## 2023-10-20 PROCEDURE — 3074F PR MOST RECENT SYSTOLIC BLOOD PRESSURE < 130 MM HG: ICD-10-PCS | Mod: CPTII,S$GLB,,

## 2023-10-20 RX ORDER — BUPROPION HYDROCHLORIDE 300 MG/1
300 TABLET ORAL DAILY
Qty: 90 TABLET | Refills: 1 | Status: SHIPPED | OUTPATIENT
Start: 2023-10-20

## 2023-10-20 RX ORDER — ASPIRIN 81 MG/1
81 TABLET ORAL DAILY
Qty: 90 TABLET | Refills: 1 | Status: SHIPPED | OUTPATIENT
Start: 2023-10-20 | End: 2024-10-19

## 2023-10-20 RX ORDER — PROGESTERONE 200 MG/1
CAPSULE ORAL
COMMUNITY
Start: 2023-10-03

## 2023-10-20 RX ORDER — ROSUVASTATIN CALCIUM 20 MG/1
20 TABLET, COATED ORAL DAILY
Qty: 90 TABLET | Refills: 1 | Status: SHIPPED | OUTPATIENT
Start: 2023-10-20

## 2023-10-20 RX ORDER — ERGOCALCIFEROL 1.25 MG/1
50000 CAPSULE ORAL
COMMUNITY
Start: 2023-10-03

## 2023-10-20 RX ORDER — ALPRAZOLAM 0.5 MG/1
0.5 TABLET ORAL NIGHTLY PRN
Qty: 30 TABLET | Refills: 2 | Status: SHIPPED | OUTPATIENT
Start: 2023-10-20

## 2023-10-20 RX ORDER — LEVOTHYROXINE SODIUM 137 UG/1
137 TABLET ORAL DAILY
Qty: 90 TABLET | Refills: 1 | Status: SHIPPED | OUTPATIENT
Start: 2023-10-20

## 2023-10-20 NOTE — PROGRESS NOTES
Subjective:       Patient ID: Leslie Boasso Cheramie is a 48 y.o. female.    Chief Complaint: Medication Refill    Leslie Boasso Cheramie is a 48 y.o. female patient that presents to clinic for medications refills. Anxiety: doing well. Taking wellbutrin xl 300 mg daily. Does have some anxiety with her job but it is manageable. Uses xanax prn. Doesn't use often.  Hypothyroidism. On levothyroxine 137 mcg daily. Last TSH March 2022.  Hyperlipidemia: taking rosuvastatin 20 mg daily. Dur for repeat lipid panel.   Saw a new gynecologist last month. She was put on vitamin d due to deficiency.  She had her hormones tested and she was recommended to start progesterone and hormone pellets.  She continues to complain of pain in her right hip. MRI shows severe right hip osteoarthrosis with joint effusion and 5 mm intra-articular low signal intensity focus suggesting a loose body and Moderate to severe left hip osteoarthrosis with small joint effusion. She was referred to Orthopedics but did not go.                             Review of patient's allergies indicates:  No Known Allergies  Social Determinants of Health     Tobacco Use: High Risk (10/20/2023)    Patient History     Smoking Tobacco Use: Every Day     Smokeless Tobacco Use: Never     Passive Exposure: Not on file   Alcohol Use: Not on file   Financial Resource Strain: Not on file   Food Insecurity: Not on file   Transportation Needs: Not on file   Physical Activity: Not on file   Stress: Not on file   Social Connections: Not on file   Housing Stability: Not on file   Depression: Low Risk  (10/20/2023)    Depression     Last PHQ-4: Flowsheet Data: 0      No past medical history on file.   No past surgical history on file.   Social History     Socioeconomic History    Marital status:          Current Outpatient Medications:     progesterone (PROMETRIUM) 200 MG capsule, Take by mouth., Disp: , Rfl:     VITAMIN D2 1,250 mcg (50,000 unit) capsule, Take 50,000 Units  by mouth every 7 days., Disp: , Rfl:     ALPRAZolam (XANAX) 0.5 MG tablet, Take 1 tablet (0.5 mg total) by mouth nightly as needed for Anxiety., Disp: 30 tablet, Rfl: 2    aspirin (ECOTRIN) 81 MG EC tablet, Take 1 tablet (81 mg total) by mouth once daily., Disp: 90 tablet, Rfl: 1    buPROPion (WELLBUTRIN XL) 300 MG 24 hr tablet, Take 1 tablet (300 mg total) by mouth once daily., Disp: 90 tablet, Rfl: 1    levothyroxine (SYNTHROID) 137 MCG Tab tablet, Take 1 tablet (137 mcg total) by mouth once daily., Disp: 90 tablet, Rfl: 1    rosuvastatin (CRESTOR) 20 MG tablet, Take 1 tablet (20 mg total) by mouth once daily., Disp: 90 tablet, Rfl: 1    Lab Results   Component Value Date    WBC 6.6 06/29/2021    HGB 14.8 06/29/2021    HCT 44.0 06/29/2021     06/29/2021    CHOL 287 (H) 02/01/2022    TRIG 164 (H) 02/01/2022    HDL 78 02/01/2022    ALT 10 02/01/2022    AST 18 02/01/2022     02/01/2022    K 5.0 02/01/2022     02/01/2022    CREATININE 0.71 02/01/2022    BUN 11 02/01/2022    CO2 25 02/01/2022    TSH 3.11 02/01/2022       Review of Systems   Constitutional: Negative.    Respiratory: Negative.     Cardiovascular: Negative.    Musculoskeletal:  Positive for arthralgias.       Objective:      Physical Exam  Vitals reviewed.   Constitutional:       Appearance: Normal appearance.   Cardiovascular:      Rate and Rhythm: Normal rate and regular rhythm.      Pulses: Normal pulses.      Heart sounds: Normal heart sounds.   Pulmonary:      Effort: Pulmonary effort is normal.      Breath sounds: Normal breath sounds.   Abdominal:      General: Bowel sounds are normal.      Palpations: Abdomen is soft.      Tenderness: There is no abdominal tenderness.   Skin:     General: Skin is warm and dry.      Capillary Refill: Capillary refill takes less than 2 seconds.   Neurological:      Mental Status: She is alert and oriented to person, place, and time.   Psychiatric:         Mood and Affect: Mood normal.          Behavior: Behavior normal.         Thought Content: Thought content normal.         Judgment: Judgment normal.         Assessment:       1. Anxiety    2. Hyperlipidemia, unspecified hyperlipidemia type    3. ASCVD (arteriosclerotic cardiovascular disease)    4. Hypothyroidism, unspecified type    5. Arthralgia of right hip    6. Screening for diabetes mellitus        Plan:       Eli was seen today for medication refill.    Diagnoses and all orders for this visit:    Anxiety  -     ALPRAZolam (XANAX) 0.5 MG tablet; Take 1 tablet (0.5 mg total) by mouth nightly as needed for Anxiety.  -     buPROPion (WELLBUTRIN XL) 300 MG 24 hr tablet; Take 1 tablet (300 mg total) by mouth once daily.    Hyperlipidemia, unspecified hyperlipidemia type  -     rosuvastatin (CRESTOR) 20 MG tablet; Take 1 tablet (20 mg total) by mouth once daily.  -     Lipid Panel; Future    ASCVD (arteriosclerotic cardiovascular disease)  -     CBC Auto Differential; Future  -     Comprehensive Metabolic Panel; Future  -     aspirin (ECOTRIN) 81 MG EC tablet; Take 1 tablet (81 mg total) by mouth once daily.    Hypothyroidism, unspecified type  -     levothyroxine (SYNTHROID) 137 MCG Tab tablet; Take 1 tablet (137 mcg total) by mouth once daily.  -     TSH; Future    Arthralgia of right hip    Screening for diabetes mellitus  -     Hemoglobin A1C; Future    Anxiety  - xanax refilled.  checked  - continue Wellbutrin at current dose    Hyperlipidemia  - continue Crestor  - lipid panel  - I recommend a heart healthy diet rich in fiber, fresh vegetables and fruit and low in saturated fats (fried foods, red meat, etc.).  I also recommend regular exercise.    ASCVD  - continue daily aspirin    Hypothyroidism  - TSH  - continue levothyroxine.     Osteoarthrosis of right hip  - Needs to see Orthopedics  - patient state she will make appointment.     Due for colonoscopy. Patient has a GI doctor and will schedule. Due for flu vaccine. Patient declines  today. Have fasting labs done. Pap smear results requested from Dr. Sol Pabon. Follow up in 3-6 months or sooner if needed.

## 2023-10-23 ENCOUNTER — PATIENT OUTREACH (OUTPATIENT)
Dept: ADMINISTRATIVE | Facility: HOSPITAL | Age: 48
End: 2023-10-23
Payer: COMMERCIAL

## 2023-11-03 ENCOUNTER — OFFICE VISIT (OUTPATIENT)
Dept: URGENT CARE | Facility: CLINIC | Age: 48
End: 2023-11-03
Payer: OTHER MISCELLANEOUS

## 2023-11-03 VITALS
WEIGHT: 169 LBS | OXYGEN SATURATION: 99 % | RESPIRATION RATE: 16 BRPM | HEART RATE: 81 BPM | DIASTOLIC BLOOD PRESSURE: 74 MMHG | SYSTOLIC BLOOD PRESSURE: 114 MMHG | HEIGHT: 62 IN | BODY MASS INDEX: 31.1 KG/M2 | TEMPERATURE: 98 F

## 2023-11-03 DIAGNOSIS — Z02.6 ENCOUNTER RELATED TO WORKER'S COMPENSATION CLAIM: Primary | ICD-10-CM

## 2023-11-03 DIAGNOSIS — M25.561 ACUTE PAIN OF RIGHT KNEE: ICD-10-CM

## 2023-11-03 DIAGNOSIS — R07.81 RIB PAIN ON RIGHT SIDE: ICD-10-CM

## 2023-11-03 PROCEDURE — 73562 XR KNEE 3 VIEW RIGHT: ICD-10-PCS | Mod: RT,S$GLB,, | Performed by: RADIOLOGY

## 2023-11-03 PROCEDURE — 99204 OFFICE O/P NEW MOD 45 MIN: CPT | Mod: S$GLB,,,

## 2023-11-03 PROCEDURE — 71101 X-RAY EXAM UNILAT RIBS/CHEST: CPT | Mod: RT,S$GLB,, | Performed by: RADIOLOGY

## 2023-11-03 PROCEDURE — 71101 XR RIBS MIN 3 VIEWS W/ PA CHEST RIGHT: ICD-10-PCS | Mod: RT,S$GLB,, | Performed by: RADIOLOGY

## 2023-11-03 PROCEDURE — 73562 X-RAY EXAM OF KNEE 3: CPT | Mod: RT,S$GLB,, | Performed by: RADIOLOGY

## 2023-11-03 PROCEDURE — 99204 PR OFFICE/OUTPT VISIT, NEW, LEVL IV, 45-59 MIN: ICD-10-PCS | Mod: S$GLB,,,

## 2023-11-03 NOTE — PROGRESS NOTES
"Subjective:      Patient ID: Leslie Boasso Cheramie is a 48 y.o. female.    Vitals:  height is 5' 2" (1.575 m) and weight is 76.7 kg (169 lb). Her oral temperature is 98.3 °F (36.8 °C). Her blood pressure is 114/74 and her pulse is 81. Her respiration is 16 and oxygen saturation is 99%.     Chief Complaint: Fall    W/C initial visit.  DOI: 10/31/23.  Patient is a teacher at [employer].  Patient states she tripped over a schoolbag and fell onto right knee.  Patient's upper body fell into table in front of her.  Patient complains of right sided rib pain and right knee pain.  There is a small bruise on the right side upper right breast where patient states her pain is concentrated.  She denies difficulty breathing or shortness of breath but does state if she takes a deep breath or laughs she does have pain that radiates from the location of the bruise to the right breast.  There is a small minor bruise on the right patella.  There does not appear to be any abnormal swelling or discoloration of the right knee.  There is no laxity present in the right knee.     Fall        Constitution: Negative.   HENT: Negative.     Neck: neck negative.   Cardiovascular:  Positive for chest trauma (impact from fall onto desk) and chest pain (Rib, right side). Negative for leg swelling, palpitations and sob on exertion.   Eyes: Negative.    Respiratory: Negative.  Negative for chest tightness and shortness of breath.    Gastrointestinal: Negative.    Genitourinary: Negative.    Musculoskeletal:  Positive for joint pain and joint swelling.   Skin:  Positive for bruising (right upper breast and right patela).   Allergic/Immunologic: Negative.    Psychiatric/Behavioral: Negative.        Objective:     Physical Exam   Constitutional: She is oriented to person, place, and time. No distress. normal  HENT:   Head: Normocephalic and atraumatic.   Ears:   Right Ear: External ear normal.   Left Ear: External ear normal.   Nose: Nose normal. "   Mouth/Throat: Mucous membranes are moist. Oropharynx is clear.   Eyes: Conjunctivae are normal.   Cardiovascular: Normal rate.   Pulmonary/Chest: Effort normal. No respiratory distress. She exhibits tenderness.   Abdominal: Normal appearance.   Musculoskeletal:         General: Tenderness and signs of injury present. No swelling or deformity.      Comments: Right knee   Neurological: She is alert and oriented to person, place, and time. She displays no weakness.   Skin: Skin is warm and dry. Capillary refill takes less than 2 seconds. bruising (right upper breast and knee)   Psychiatric: Her behavior is normal. Mood, judgment and thought content normal.   Nursing note and vitals reviewed.      Assessment:     1. Encounter related to worker's compensation claim    2. Rib pain on right side    3. Acute pain of right knee      EXAMINATION:  XR RIBS MIN 3 VIEWS W/ PA CHEST RIGHT     CLINICAL HISTORY:  Pleurodynia     TECHNIQUE:  A PA chest and detail images of the right images are obtained     COMPARISON:  None     FINDINGS:  On the chest the mediastinal and cardiac size and contours normal.  The diaphragms of pleura are clear.  There are no intrapulmonary masses or infiltrates.  There is no pneumothorax or pleural effusion.     On these images a fracture or other osseous abnormalities of the right ribs is not identified.     Impression:     Negative chest x-ray, negative right rib x-rays.        Electronically signed by: Jacky Villasenor MD  Date:                                            11/03/2023  Time:                                           17:47    EXAMINATION:  XR KNEE 3 VIEW RIGHT     CLINICAL HISTORY:  Pain in right knee     TECHNIQUE:  AP, lateral, and Merchant views of the right knee were performed.     COMPARISON:  None     FINDINGS:  A fracture of the femur, patella, tibia or fibula is not seen.  There is mild narrowing of the medial intercondylar joint space.  Spur formation, sclerosis or bone  erosion is not seen.  No joint effusion is noted.     Impression:     Possible early osteoarthritis otherwise negative right knee.        Electronically signed by: Jacky Villasenor MD  Date:                                            11/03/2023  Time:                                           17:46  Plan:       Encounter related to worker's compensation claim    Rib pain on right side  -     XR RIB RIGHT W/ PA CHEST; Future; Expected date: 11/03/2023    Acute pain of right knee  -     XR KNEE 3 VIEW RIGHT; Future; Expected date: 11/03/2023      XR results discussed: no acute abnormality     Discussed use of OTC tylenol and motrin as directed. Also discussed Ace wrap and OTC knee brace. Patient to f/u in 7 days for re-eval and release, she can return to work with no restrictions today.

## 2023-11-03 NOTE — LETTER
[facility]  [address] [address]  Phone: [phone]  [facility] [facility]: [phone]    Pt Name: Leslie Boasso Cheramie  Injury Date: 10/31/2023   Employee ID:  Date of First Treatment: 11/03/2023   Company: [employer]      Appointment Time: 11:45 AM Arrived: 5:00pm   Provider: Latosha Pak NP Time Out:6:19pm     Office Treatment:   1. Encounter related to worker's compensation claim    2. Rib pain on right side    3. Acute pain of right knee          Patient Instructions: Attention not to aggravate affected area    Restrictions: Regular Duty     Return Appointment: 11/10/2023

## 2023-11-03 NOTE — PATIENT INSTRUCTIONS
Alternate ibuprofen and tylenol as directed Over the Counter.    As discussed you can get a compression sleeve for your knee from the drug store for support of the right knee.     Follow up in 1 week or sooner if needed.    Acitretin Pregnancy And Lactation Text: This medication is Pregnancy Category X and should not be given to women who are pregnant or may become pregnant in the future. This medication is excreted in breast milk.

## 2023-11-03 NOTE — PROGRESS NOTES
"Subjective:      Patient ID: Leslie Boasso Cheramie is a 48 y.o. female.    Vitals:  height is 5' 2" (1.575 m) and weight is 76.7 kg (169 lb). Her respiration is 16.     Chief Complaint: Fall    W/C initial visit.  DOI: 10/31/23.  Patient is a teacher at [employer].  Patient states she tripped over a schoolbag and fell onto right knee.  Patient's upper body fell into table in front of her.  Patient complains of right sided rib pain and right knee pain.     Fall  The accident occurred 3 to 5 days ago. The fall occurred while walking. She landed on Hard floor. The point of impact was the right knee and right hip. The pain is present in the right knee. She has tried NSAID for the symptoms.   ROS   Objective:     Physical Exam    Assessment:     1. Encounter related to worker's compensation claim        Plan:       Encounter related to worker's compensation claim                    "

## 2023-12-21 ENCOUNTER — HOSPITAL ENCOUNTER (OUTPATIENT)
Dept: RADIOLOGY | Facility: HOSPITAL | Age: 48
Discharge: HOME | End: 2023-12-21
Attending: SPECIALIST
Payer: COMMERCIAL

## 2023-12-21 VITALS — HEIGHT: 62 IN | WEIGHT: 169 LBS | BODY MASS INDEX: 31.1 KG/M2

## 2023-12-21 DIAGNOSIS — N95.1 MENOPAUSAL STATE: ICD-10-CM

## 2023-12-21 DIAGNOSIS — Z13.820 ENCOUNTER FOR OSTEOPOROSIS SCREENING IN ASYMPTOMATIC POSTMENOPAUSAL PATIENT: ICD-10-CM

## 2023-12-21 DIAGNOSIS — Z12.31 ENCOUNTER FOR SCREENING MAMMOGRAM FOR MALIGNANT NEOPLASM OF BREAST: ICD-10-CM

## 2023-12-21 DIAGNOSIS — Z78.0 ENCOUNTER FOR OSTEOPOROSIS SCREENING IN ASYMPTOMATIC POSTMENOPAUSAL PATIENT: ICD-10-CM

## 2023-12-21 PROCEDURE — 77080 DXA BONE DENSITY AXIAL: CPT | Mod: TC,PO

## 2023-12-21 PROCEDURE — 77067 SCR MAMMO BI INCL CAD: CPT | Mod: TC,PO

## 2024-01-05 ENCOUNTER — PATIENT MESSAGE (OUTPATIENT)
Dept: ADMINISTRATIVE | Facility: HOSPITAL | Age: 49
End: 2024-01-05
Payer: COMMERCIAL

## 2024-01-05 LAB — CRC RECOMMENDATION EXT: NORMAL

## 2024-01-16 ENCOUNTER — PATIENT OUTREACH (OUTPATIENT)
Dept: ADMINISTRATIVE | Facility: HOSPITAL | Age: 49
End: 2024-01-16
Payer: COMMERCIAL

## 2024-01-16 NOTE — PROGRESS NOTES
Population Health Chart Review & Patient Outreach Details    Outreach Performed: NO    Additional Pop Health Notes:           Updates Requested / Reviewed:      Updated Care Coordination Note, , and Immunizations Reconciliation Completed or Queried: [place]         Health Maintenance Topics Overdue:    Health Maintenance Due   Topic Date Due    COVID-19 Vaccine (1) Never done    Pneumococcal Vaccines (Age 0-64) (1 - PCV) Never done    Influenza Vaccine (1) 09/01/2023         Health Maintenance Topic(s) Outreach Outcomes & Actions Taken:    Colorectal Cancer Screening - Outreach Outcomes & Actions Taken  : External Records Uploaded, Care Team Updated, & History Updated if Applicable